# Patient Record
Sex: MALE | Race: WHITE | HISPANIC OR LATINO | URBAN - METROPOLITAN AREA
[De-identification: names, ages, dates, MRNs, and addresses within clinical notes are randomized per-mention and may not be internally consistent; named-entity substitution may affect disease eponyms.]

---

## 2023-08-07 ENCOUNTER — APPOINTMENT (RX ONLY)
Dept: URBAN - METROPOLITAN AREA CLINIC 339 | Facility: CLINIC | Age: 73
Setting detail: DERMATOLOGY
End: 2023-08-07

## 2023-08-07 DIAGNOSIS — L72.8 OTHER FOLLICULAR CYSTS OF THE SKIN AND SUBCUTANEOUS TISSUE: ICD-10-CM

## 2023-08-07 PROCEDURE — ? PRESCRIPTION SAMPLES PROVIDED

## 2023-08-07 PROCEDURE — 99202 OFFICE O/P NEW SF 15 MIN: CPT

## 2023-08-07 PROCEDURE — ? CONSULTATION EXCISION

## 2023-08-07 PROCEDURE — ? COUNSELING

## 2023-08-07 ASSESSMENT — LOCATION DETAILED DESCRIPTION DERM: LOCATION DETAILED: RIGHT BUTTOCK

## 2023-08-07 ASSESSMENT — LOCATION ZONE DERM: LOCATION ZONE: TRUNK

## 2023-08-07 ASSESSMENT — LOCATION SIMPLE DESCRIPTION DERM: LOCATION SIMPLE: RIGHT BUTTOCK

## 2023-08-07 NOTE — PROCEDURE: PRESCRIPTION SAMPLES PROVIDED
Samples Given: Onexton gel to apply qd, can occlude to protect from bleaching clothing.
Detail Level: Zone

## 2023-08-07 NOTE — PROCEDURE: CONSULTATION EXCISION
Detail Level: Detailed
Other Plan: plan for punch excision
X Size Of Lesion In Cm (Optional): 0
Size Of Lesion: 0.5